# Patient Record
Sex: FEMALE | Race: WHITE | Employment: OTHER | URBAN - METROPOLITAN AREA
[De-identification: names, ages, dates, MRNs, and addresses within clinical notes are randomized per-mention and may not be internally consistent; named-entity substitution may affect disease eponyms.]

---

## 2017-06-11 ENCOUNTER — HOSPITAL ENCOUNTER (EMERGENCY)
Age: 82
Discharge: HOME | End: 2017-06-11
Attending: EMERGENCY MEDICINE
Payer: MEDICARE

## 2017-06-11 ENCOUNTER — APPOINTMENT (OUTPATIENT)
Dept: GENERAL RADIOLOGY | Age: 82
End: 2017-06-11
Attending: EMERGENCY MEDICINE
Payer: MEDICARE

## 2017-06-11 VITALS
HEIGHT: 62 IN | WEIGHT: 96 LBS | SYSTOLIC BLOOD PRESSURE: 149 MMHG | DIASTOLIC BLOOD PRESSURE: 65 MMHG | HEART RATE: 82 BPM | TEMPERATURE: 97.9 F | BODY MASS INDEX: 17.66 KG/M2 | RESPIRATION RATE: 18 BRPM | OXYGEN SATURATION: 98 %

## 2017-06-11 DIAGNOSIS — N30.00 ACUTE CYSTITIS WITHOUT HEMATURIA: Primary | ICD-10-CM

## 2017-06-11 DIAGNOSIS — K90.0 CELIAC DISEASE: ICD-10-CM

## 2017-06-11 DIAGNOSIS — R10.84 ABDOMINAL PAIN, GENERALIZED: ICD-10-CM

## 2017-06-11 LAB
ALBUMIN SERPL BCP-MCNC: 4 G/DL (ref 3.4–5)
ALBUMIN/GLOB SERPL: 1.1 {RATIO} (ref 0.8–1.7)
ALP SERPL-CCNC: 52 U/L (ref 45–117)
ALT SERPL-CCNC: 16 U/L (ref 13–56)
AMORPH CRY URNS QL MICRO: ABNORMAL
ANION GAP BLD CALC-SCNC: 14 MMOL/L (ref 3–18)
APPEARANCE UR: ABNORMAL
AST SERPL W P-5'-P-CCNC: 22 U/L (ref 15–37)
BACTERIA URNS QL MICRO: ABNORMAL /HPF
BASOPHILS # BLD AUTO: 0 K/UL (ref 0–0.06)
BASOPHILS # BLD: 0 % (ref 0–2)
BILIRUB SERPL-MCNC: 0.8 MG/DL (ref 0.2–1)
BILIRUB UR QL: NEGATIVE
BUN SERPL-MCNC: 18 MG/DL (ref 7–18)
BUN/CREAT SERPL: 25 (ref 12–20)
CALCIUM SERPL-MCNC: 9.2 MG/DL (ref 8.5–10.1)
CHLORIDE SERPL-SCNC: 100 MMOL/L (ref 100–108)
CO2 SERPL-SCNC: 26 MMOL/L (ref 21–32)
COLOR UR: YELLOW
CREAT SERPL-MCNC: 0.71 MG/DL (ref 0.6–1.3)
DIFFERENTIAL METHOD BLD: NORMAL
EOSINOPHIL # BLD: 0 K/UL (ref 0–0.4)
EOSINOPHIL NFR BLD: 0 % (ref 0–5)
EPITH CASTS URNS QL MICRO: ABNORMAL /LPF (ref 0–5)
ERYTHROCYTE [DISTWIDTH] IN BLOOD BY AUTOMATED COUNT: 13.4 % (ref 11.6–14.5)
GLOBULIN SER CALC-MCNC: 3.8 G/DL (ref 2–4)
GLUCOSE SERPL-MCNC: 101 MG/DL (ref 74–99)
GLUCOSE UR STRIP.AUTO-MCNC: NEGATIVE MG/DL
GRAN CASTS URNS QL MICRO: ABNORMAL /LPF
HCT VFR BLD AUTO: 40 % (ref 35–45)
HGB BLD-MCNC: 14.1 G/DL (ref 12–16)
HGB UR QL STRIP: ABNORMAL
KETONES UR QL STRIP.AUTO: 15 MG/DL
LEUKOCYTE ESTERASE UR QL STRIP.AUTO: ABNORMAL
LIPASE SERPL-CCNC: 329 U/L (ref 73–393)
LYMPHOCYTES # BLD AUTO: 29 % (ref 21–52)
LYMPHOCYTES # BLD: 2 K/UL (ref 0.9–3.6)
MCH RBC QN AUTO: 29.3 PG (ref 24–34)
MCHC RBC AUTO-ENTMCNC: 35.3 G/DL (ref 31–37)
MCV RBC AUTO: 83.2 FL (ref 74–97)
MONOCYTES # BLD: 0.7 K/UL (ref 0.05–1.2)
MONOCYTES NFR BLD AUTO: 10 % (ref 3–10)
NEUTS SEG # BLD: 4.2 K/UL (ref 1.8–8)
NEUTS SEG NFR BLD AUTO: 61 % (ref 40–73)
NITRITE UR QL STRIP.AUTO: POSITIVE
PH UR STRIP: 7 [PH] (ref 5–8)
PLATELET # BLD AUTO: 226 K/UL (ref 135–420)
PMV BLD AUTO: 9.2 FL (ref 9.2–11.8)
POTASSIUM SERPL-SCNC: 3.5 MMOL/L (ref 3.5–5.5)
PROT SERPL-MCNC: 7.8 G/DL (ref 6.4–8.2)
PROT UR STRIP-MCNC: ABNORMAL MG/DL
RBC # BLD AUTO: 4.81 M/UL (ref 4.2–5.3)
RBC #/AREA URNS HPF: ABNORMAL /HPF (ref 0–5)
SODIUM SERPL-SCNC: 140 MMOL/L (ref 136–145)
SP GR UR REFRACTOMETRY: 1.02 (ref 1–1.03)
UROBILINOGEN UR QL STRIP.AUTO: 1 EU/DL (ref 0.2–1)
WBC # BLD AUTO: 7 K/UL (ref 4.6–13.2)
WBC URNS QL MICRO: ABNORMAL /HPF (ref 0–5)

## 2017-06-11 PROCEDURE — 74011250636 HC RX REV CODE- 250/636: Performed by: EMERGENCY MEDICINE

## 2017-06-11 PROCEDURE — 85025 COMPLETE CBC W/AUTO DIFF WBC: CPT | Performed by: EMERGENCY MEDICINE

## 2017-06-11 PROCEDURE — 96360 HYDRATION IV INFUSION INIT: CPT

## 2017-06-11 PROCEDURE — 81001 URINALYSIS AUTO W/SCOPE: CPT | Performed by: EMERGENCY MEDICINE

## 2017-06-11 PROCEDURE — 99285 EMERGENCY DEPT VISIT HI MDM: CPT

## 2017-06-11 PROCEDURE — 80053 COMPREHEN METABOLIC PANEL: CPT | Performed by: EMERGENCY MEDICINE

## 2017-06-11 PROCEDURE — 83690 ASSAY OF LIPASE: CPT | Performed by: EMERGENCY MEDICINE

## 2017-06-11 PROCEDURE — 74022 RADEX COMPL AQT ABD SERIES: CPT

## 2017-06-11 RX ORDER — DICYCLOMINE HYDROCHLORIDE 10 MG/1
10 CAPSULE ORAL 4 TIMES DAILY
Qty: 20 CAP | Refills: 0 | Status: SHIPPED | OUTPATIENT
Start: 2017-06-11 | End: 2017-06-16

## 2017-06-11 RX ORDER — SULFAMETHOXAZOLE AND TRIMETHOPRIM 800; 160 MG/1; MG/1
1 TABLET ORAL 2 TIMES DAILY
Qty: 10 TAB | Refills: 0 | Status: SHIPPED | OUTPATIENT
Start: 2017-06-11 | End: 2017-06-16

## 2017-06-11 RX ORDER — SODIUM CHLORIDE 0.9 % (FLUSH) 0.9 %
5-10 SYRINGE (ML) INJECTION EVERY 8 HOURS
Status: DISCONTINUED | OUTPATIENT
Start: 2017-06-11 | End: 2017-06-11 | Stop reason: HOSPADM

## 2017-06-11 RX ORDER — SODIUM CHLORIDE 0.9 % (FLUSH) 0.9 %
5-10 SYRINGE (ML) INJECTION AS NEEDED
Status: DISCONTINUED | OUTPATIENT
Start: 2017-06-11 | End: 2017-06-11 | Stop reason: HOSPADM

## 2017-06-11 RX ADMIN — SODIUM CHLORIDE 500 ML: 900 INJECTION, SOLUTION INTRAVENOUS at 13:27

## 2017-06-11 NOTE — ED PROVIDER NOTES
HPI Comments: 12:09 PM   Bre Rai is a 80 y.o. female h/o celiac disease presenting to the ED via EMS C/O back pain from active shingles, onset 1 month ago. Pt also complains of chronic abdominal pain (8/10) and constipation, which she has experienced in the past due to her celiac disease. \"I know I do not eat right. \" PMHx of colon cancer and abdominal cancer. Pt denies blood in the stool and any other Sx or complaints. Written by EMANUEL Ramirez, as dictated by Jarod Lezama MD.      Patient is a 80 y.o. female presenting with abdominal pain. The history is provided by the patient. No  was used. Abdominal Pain    This is a chronic problem. Episode onset: 1 month ago. The pain is at a severity of 8/10. Associated symptoms include constipation and back pain (from shingles). Pertinent negatives include no fever, no diarrhea, no nausea, no vomiting, no dysuria, no frequency, no hematuria, no headaches, no arthralgias, no myalgias and no chest pain. Her past medical history is significant for cancer. Past Medical History:   Diagnosis Date    Arthritis     Cancer ([address]     ovarian /bowel cancer    Ill-defined condition     celiac disease    Neurological disorder     ataxia/dizziness       Past Surgical History:   Procedure Laterality Date    ABDOMEN SURGERY PROC UNLISTED      bowel resection    HX GYN      hysterectomy    HX HEENT      tonsillectomy         History reviewed. No pertinent family history. Social History     Social History    Marital status:      Spouse name: N/A    Number of children: N/A    Years of education: N/A     Occupational History    Not on file.      Social History Main Topics    Smoking status: Never Smoker    Smokeless tobacco: Not on file    Alcohol use No    Drug use: No    Sexual activity: Not on file     Other Topics Concern    Not on file     Social History Narrative    No narrative on file ALLERGIES: Review of patient's allergies indicates no known allergies. Review of Systems   Constitutional: Negative for appetite change, chills and fever. HENT: Negative for congestion, rhinorrhea and sore throat. Eyes: Negative for pain, discharge and visual disturbance. Respiratory: Negative for cough, chest tightness, shortness of breath and wheezing. Cardiovascular: Negative for chest pain and leg swelling. Gastrointestinal: Positive for abdominal pain and constipation. Negative for diarrhea, nausea and vomiting. Endocrine: Negative for polydipsia and polyuria. Genitourinary: Negative for difficulty urinating, dysuria, frequency, hematuria, menstrual problem, pelvic pain, urgency, vaginal bleeding and vaginal discharge. Musculoskeletal: Positive for back pain (from shingles). Negative for arthralgias and myalgias. Skin: Positive for rash (shingles on the back). Negative for color change. Neurological: Negative for weakness, numbness and headaches. Hematological: Does not bruise/bleed easily. Psychiatric/Behavioral: Negative for decreased concentration. All other systems reviewed and are negative. Vitals:    06/11/17 1122 06/11/17 1307   BP: 158/68 149/65   Pulse: 79 82   Resp: 18 18   Temp: 97.9 °F (36.6 °C)    SpO2: 99% 98%   Weight: 43.5 kg (96 lb)    Height: 5' 2\" (1.575 m)             Physical Exam   Nursing note and vitals reviewed. GEN:  Nontoxic appearing; Alert and Oriented; Appears well hydrated. No acute distress. SKIN:  Warm and dry, no rashes. No petechia. Good skin turgor. HEAD:  Normocephalic and Atraumatic;    ENT: Oral mucosa tachy, pharynx clear;   NECK:  Supple. Trachea is Midline; No adenopathy. HEART:  Regular rate and rhythm No murmur. No rubs. LUNGS:  Clear bilaterally; Normal respiratory effort,  ABD:  Positive bowel sounds. Soft and flat with tenderness on right and left mid quadrant. No peritoneal signs. No organomegaly.  No hernias. BACK: No obvious bony spinal defects or changes; Non tender paraspinous muscles  : Normal inspection; no rash; NO CVAT  EXT:  No obvious soft tissue tenderness or sites of bony tenderness; Joints- good ROM. No LE edema. NEURO: CN- intact; No focal motor deficits; Cerebellar function- intact; DTR's - 2+     MDM  Number of Diagnoses or Management Options  Diagnosis management comments: INITIAL CLINICAL IMPRESSION and PLANS:  The patient presents with the primary complaint(s) of: abdominal pain. The presentation, to include historical aspects and clinical findings appear to be consistent with the DX of constipation. However, other possible DX's to consider as primary, associated with, or exacerbated by include:    1.  celiac disease   2. IBS   3.  colon cancer   4. bowel obstruction       Kendall Elizondo MD         Amount and/or Complexity of Data Reviewed  Clinical lab tests: ordered and reviewed  Tests in the radiology section of CPT®: ordered and reviewed (XR ABD)  Independent visualization of images, tracings, or specimens: yes (XR ABD)      ED Course     MEDICATIONS GIVEN:  Medications   sodium chloride (NS) flush 5-10 mL (not administered)   sodium chloride (NS) flush 5-10 mL (not administered)   sodium chloride 0.9 % bolus infusion 500 mL (0 mL IntraVENous IV Completed 6/11/17 1414)        Procedures    ED CLINICAL SUMMARY - DISCHARGE     2:06 PM  CLINICAL COURSE while in the ED:      Intervention)s) while in ED:  ACTIONS / APPROACH: Based on the presenting RECURRENT history of abdominal pain My initial focus was to determine the extent of the problem. Details of actions taken are noted below. SPECIFICS REGARDING APPROACH: since the complaint was non specific and sounded to be chronic /  Recurrent in nature, xrays will be done instead of CT scan. 1. DIAGNOSTIC RESULTS:     PULSE OXIMETRY NOTE:  Pulse-ox is 100% on room air.    Interpretation: Normal    2:07 PM  RADIOLOGY FINDINGS  ABD X-ray shows a focal collection of gas, but no obstructive pattern. Pending review by Radiologist  Recorded by Mark Latham ED Scribe, as dictated by Daryel Snellen, MD     XR ABD ACUTE W 1 V CHEST   Final Result   The lungs are clear of focal infiltrate. There is no pneumothorax or pleural  effusion. Heart size is within normal limits. Atherosclerosis of the thoracic  aorta. There is no significant vascular congestion. There are distended small bowel loops in the right upper quadrant. Moderate  stool burden in the colon and rectum. Surgical sutures in the left upper  quadrant. There is no free air seen on the upright view.      There is no acute osseous abnormality.    As read by the radiologist.       Labs Reviewed   URINALYSIS W/ RFLX MICROSCOPIC - Abnormal; Notable for the following:        Result Value    Protein TRACE (*)     Ketone 15 (*)     Blood TRACE (*)     Nitrites POSITIVE (*)     Leukocyte Esterase SMALL (*)     All other components within normal limits   METABOLIC PANEL, COMPREHENSIVE - Abnormal; Notable for the following:     Glucose 101 (*)     BUN/Creatinine ratio 25 (*)     All other components within normal limits   URINE MICROSCOPIC ONLY - Abnormal; Notable for the following:     Bacteria 3+ (*)     Amorphous Crystals FEW (*)     All other components within normal limits   CBC WITH AUTOMATED DIFF   LIPASE     Recent Results (from the past 12 hour(s))   URINALYSIS W/ RFLX MICROSCOPIC    Collection Time: 06/11/17 11:41 AM   Result Value Ref Range    Color YELLOW      Appearance CLOUDY      Specific gravity 1.019 1.005 - 1.030      pH (UA) 7.0 5.0 - 8.0      Protein TRACE (A) NEG mg/dL    Glucose NEGATIVE  NEG mg/dL    Ketone 15 (A) NEG mg/dL    Bilirubin NEGATIVE  NEG      Blood TRACE (A) NEG      Urobilinogen 1.0 0.2 - 1.0 EU/dL    Nitrites POSITIVE (A) NEG      Leukocyte Esterase SMALL (A) NEG     URINE MICROSCOPIC ONLY    Collection Time: 06/11/17 11:41 AM   Result Value Ref Range WBC 4 to 10 0 - 5 /hpf    RBC 0 to 3 0 - 5 /hpf    Epithelial cells FEW 0 - 5 /lpf    Bacteria 3+ (A) NEG /hpf    Amorphous Crystals FEW (A) NEG      Granular cast 4 to 10 NEG /lpf   CBC WITH AUTOMATED DIFF    Collection Time: 06/11/17 11:54 AM   Result Value Ref Range    WBC 7.0 4.6 - 13.2 K/uL    RBC 4.81 4.20 - 5.30 M/uL    HGB 14.1 12.0 - 16.0 g/dL    HCT 40.0 35.0 - 45.0 %    MCV 83.2 74.0 - 97.0 FL    MCH 29.3 24.0 - 34.0 PG    MCHC 35.3 31.0 - 37.0 g/dL    RDW 13.4 11.6 - 14.5 %    PLATELET 684 912 - 273 K/uL    MPV 9.2 9.2 - 11.8 FL    NEUTROPHILS 61 40 - 73 %    LYMPHOCYTES 29 21 - 52 %    MONOCYTES 10 3 - 10 %    EOSINOPHILS 0 0 - 5 %    BASOPHILS 0 0 - 2 %    ABS. NEUTROPHILS 4.2 1.8 - 8.0 K/UL    ABS. LYMPHOCYTES 2.0 0.9 - 3.6 K/UL    ABS. MONOCYTES 0.7 0.05 - 1.2 K/UL    ABS. EOSINOPHILS 0.0 0.0 - 0.4 K/UL    ABS. BASOPHILS 0.0 0.0 - 0.06 K/UL    DF AUTOMATED     METABOLIC PANEL, COMPREHENSIVE    Collection Time: 06/11/17 11:54 AM   Result Value Ref Range    Sodium 140 136 - 145 mmol/L    Potassium 3.5 3.5 - 5.5 mmol/L    Chloride 100 100 - 108 mmol/L    CO2 26 21 - 32 mmol/L    Anion gap 14 3.0 - 18 mmol/L    Glucose 101 (H) 74 - 99 mg/dL    BUN 18 7.0 - 18 MG/DL    Creatinine 0.71 0.6 - 1.3 MG/DL    BUN/Creatinine ratio 25 (H) 12 - 20      GFR est AA >60 >60 ml/min/1.73m2    GFR est non-AA >60 >60 ml/min/1.73m2    Calcium 9.2 8.5 - 10.1 MG/DL    Bilirubin, total 0.8 0.2 - 1.0 MG/DL    ALT (SGPT) 16 13 - 56 U/L    AST (SGOT) 22 15 - 37 U/L    Alk. phosphatase 52 45 - 117 U/L    Protein, total 7.8 6.4 - 8.2 g/dL    Albumin 4.0 3.4 - 5.0 g/dL    Globulin 3.8 2.0 - 4.0 g/dL    A-G Ratio 1.1 0.8 - 1.7     LIPASE    Collection Time: 06/11/17 11:59 AM   Result Value Ref Range    Lipase 329 73 - 393 U/L       2.  MEDICATIONS GIVEN:   Medications   sodium chloride (NS) flush 5-10 mL (not administered)   sodium chloride (NS) flush 5-10 mL (not administered)   sodium chloride 0.9 % bolus infusion 500 mL (0 mL IntraVENous IV Completed 6/11/17 0558)        Response to Intervention(s):   IMPROVED       Unanticipated Developments: NONE     ED COURSE - General Comment:  During the ED course I had re-evaluated the patient, answered their and /or their family's questions regarding my clinical impression, the patient's condition and plans for therapeutic interventions. The patient's ED course was uneventful and remained stable throughout. CLINICAL IMPRESSION AND DISCUSSION:   I reviewed our electronic medical record system for any past medical records that were available that may contribute to the patients current condition, the nursing notes and vital signs from today's visit. Based on the clinical presentation, findings and results of diagnostic studies, as well as developments while in the ED,  I suspect the following: For the presentation noted above    My clinical impression is: chronic abdominal pain with constipation and possible exacerbation of celiac's. DISCUSSION REGARDING CLINICAL IMPRESSION: The specifics regarding my clinical impression / diagnosis are as follows: At this time there is no clinical evidence to support other pertinent diagnostic considerations such as:   Doubt bowel obstruction since the patient appears to have been having some passage of stool. SUMMARY DISCUSSION: pt with chronic problems with her intestines related to her remote colon ca and her current Celiac's disease having recurrent abdominal pain which seems to be more related to constipation and not bowel obstruction. Pt was walking about the ED and insisting to go home. She appeared to be confused as to why she was here in the first place. She was discharged home along with her daughter.        Specific Conversations:  NONE      DISPOSITION DECISION:     DISCHARGE: I feel that we have optimized outpatient assessment and management such that Мария Davison is stable to be discharged and to continue with her care or complete any additional evaluation as appropriate at home or as an outpatient. Preparations will be made to discharge the patient. Present condition at the time of disposition: STABLE    ANY SPECIFIC INFORMATION REGARDING THE DISPOSITION: NONE        DISCHARGE NOTE:    Georgette Faulkner's  results have been reviewed with her. She has been counseled regarding her diagnosis, treatment, and plan. She verbally conveys understanding and agreement of the signs, symptoms, diagnosis, treatment and prognosis and additionally agrees to follow up as discussed. She also agrees with the care-plan and conveys that all of her questions have been answered. I have also provided discharge instructions for her that include: educational information regarding their diagnosis and treatment, and list of reasons why they would want to return to the ED prior to their follow-up appointment, should her condition change. The patient and/or family has been provided with education for proper Emergency Department utilization. CLINICAL IMPRESSION  1. Acute cystitis without hematuria    2. Celiac disease    3. Abdominal pain, generalized        PLAN:  1. D/C home  2. Patient's Medications   Start Taking    DICYCLOMINE (BENTYL) 10 MG CAPSULE    Take 1 Cap by mouth four (4) times daily for 5 days. DOCUSATE SODIUM (COLACE) 50 MG CAPSULE    Take 1 Cap by mouth two (2) times a day for 90 days. TRIMETHOPRIM-SULFAMETHOXAZOLE (BACTRIM DS) 160-800 MG PER TABLET    Take 1 Tab by mouth two (2) times a day for 5 days. Continue Taking    No medications on file   These Medications have changed    No medications on file   Stop Taking    No medications on file     3. Follow-up Information     Follow up With Details Comments [address] [facility], DO Schedule an appointment as soon as possible for a visit in 2 days or follow up with your primary care physician.   Gage Diaz [id number]      [facility]  As needed, If symptoms worsen [address] [place] [address]  [phone]        Return if sxs worsen    ATTESTATIONS:  This note is prepared by Adriana Wei, acting as Scribe for Bart Padron MD.    Bart Padron MD: The scribe's documentation has been prepared under my direction and personally reviewed by me in its entirety. I confirm that the note above accurately reflects all work, treatment, procedures, and medical decision making performed by me.

## 2017-06-11 NOTE — DISCHARGE INSTRUCTIONS
Abdominal Pain: Care Instructions  Your Care Instructions    Abdominal pain has many possible causes. Some aren't serious and get better on their own in a few days. Others need more testing and treatment. If your pain continues or gets worse, you need to be rechecked and may need more tests to find out what is wrong. You may need surgery to correct the problem. Don't ignore new symptoms, such as fever, nausea and vomiting, urination problems, pain that gets worse, and dizziness. These may be signs of a more serious problem. Your doctor may have recommended a follow-up visit in the next 8 to 12 hours. If you are not getting better, you may need more tests or treatment. The doctor has checked you carefully, but problems can develop later. If you notice any problems or new symptoms, get medical treatment right away. Follow-up care is a key part of your treatment and safety. Be sure to make and go to all appointments, and call your doctor if you are having problems. It's also a good idea to know your test results and keep a list of the medicines you take. How can you care for yourself at home? · Rest until you feel better. · To prevent dehydration, drink plenty of fluids, enough so that your urine is light yellow or clear like water. Choose water and other caffeine-free clear liquids until you feel better. If you have kidney, heart, or liver disease and have to limit fluids, talk with your doctor before you increase the amount of fluids you drink. · If your stomach is upset, eat mild foods, such as rice, dry toast or crackers, bananas, and applesauce. Try eating several small meals instead of two or three large ones. · Wait until 48 hours after all symptoms have gone away before you have spicy foods, alcohol, and drinks that contain caffeine. · Do not eat foods that are high in fat. · Avoid anti-inflammatory medicines such as aspirin, ibuprofen (Advil, Motrin), and naproxen (Aleve).  These can cause stomach upset. Talk to your doctor if you take daily aspirin for another health problem. When should you call for help? Call 911 anytime you think you may need emergency care. For example, call if:  · You passed out (lost consciousness). · You pass maroon or very bloody stools. · You vomit blood or what looks like coffee grounds. · You have new, severe belly pain. Call your doctor now or seek immediate medical care if:  · Your pain gets worse, especially if it becomes focused in one area of your belly. · You have a new or higher fever. · Your stools are black and look like tar, or they have streaks of blood. · You have unexpected vaginal bleeding. · You have symptoms of a urinary tract infection. These may include:  ¨ Pain when you urinate. ¨ Urinating more often than usual.  ¨ Blood in your urine. · You are dizzy or lightheaded, or you feel like you may faint. Watch closely for changes in your health, and be sure to contact your doctor if:  · You are not getting better after 1 day (24 hours). Where can you learn more? Go to http://imtiazNexSteppedavid.info/. Enter N843 in the search box to learn more about \"Abdominal Pain: Care Instructions. \"  Current as of: May 27, 2016  Content Version: 11.2  © 9505-9306 [employer]. Care instructions adapted under license by [employer] (which disclaims liability or warranty for this information). If you have questions about a medical condition or this instruction, always ask your healthcare professional. [place] [address] any warranty or liability for your use of this information. Celiac Disease: Care Instructions  Your Care Instructions  Celiac disease (or celiac sprue) is a problem with digesting gluten. Gluten is a type of protein found in wheat, rye, and other grains. This problem starts when the body's immune system attacks the small intestine when gluten is eaten.  The immune system is supposed to fight off viruses and other invaders, but sometimes it turns on the person's own body. (This is called an autoimmune disease.) Celiac disease seems to run in families. Celiac disease causes damage to the small intestine. This makes it hard for the body to absorb vitamins and other nutrients. You cannot prevent celiac disease. But you can stop and reverse the damage to the small intestine by eating a strict gluten-free diet. Follow-up care is a key part of your treatment and safety. Be sure to make and go to all appointments, and call your doctor if you are having problems. It's also a good idea to know your test results and keep a list of the medicines you take. How can you care for yourself at home? · Eat a gluten-free diet to prevent symptoms and damage to the small intestine. Even a small amount of gluten may cause damage. ¨ Avoid all foods that contain wheat, rye, and barley gluten. Bread, bagels, pasta, pizza, malted breakfast cereals, and crackers are all examples of foods that contain gluten. ¨ Avoid oats, at least at first. Oats cause symptoms in some people. After your symptoms stop, you may be able to add oats to your diet. · You may need to avoid milk and milk products for a while. Once you stop eating any gluten, the intestine will begin to heal. Then it should be okay to drink milk and eat milk products. · Read food labels carefully and look for hidden gluten, such as gluten in medicine and some food additives. If a label says \"modified food starch,\" the product may contain gluten. · Plan your diet around:  ¨ Eggs. ¨ Dairy products, if you can eat them. Cheese, yogurt, and other dairy products can be an important part of the diet. ¨ Flours and foods made with amaranth, arrowroot, beans, buckwheat, corn, cornmeal, flax, millet, potatoes, pure uncontaminated nut and oat bran, quinoa, rice, sorghum, soybeans, tapioca, or teff. ¨ Fresh, frozen, and canned meats, fruits, and vegetables.  Watch for added gluten. · Talk to your doctor or contact your local hospital or dietitian for information about support groups in your area. You may find a support group helpful for discovering ways to help you deal with celiac disease. Celiac disease support groups often share recipes and good food sources. · Look for gluten-free foods. Many food stores, especially health food stores, offer specially marked gluten-free food. When should you call for help? Watch closely for changes in your health, and be sure to contact your doctor if:  · Your bloating, gas, and diarrhea get worse. · You have bloating, gas, and diarrhea after not having them for a while. Where can you learn more? Go to http://imtiazBioMaxdavid.info/. Enter [id number] in the search box to learn more about \"Celiac Disease: Care Instructions. \"  Current as of: August 9, 2016  Content Version: 11.2  © 2398-2504 [employer]. Care instructions adapted under license by [employer] (which disclaims liability or warranty for this information). If you have questions about a medical condition or this instruction, always ask your healthcare professional. [place] [address] any warranty or liability for your use of this information. Gluten-Free Diet: Care Instructions  Your Care Instructions  To help your symptoms, your doctor has recommended a gluten-free diet. This means not eating foods that have gluten in them. Gluten is a kind of protein. It's found in wheat, barley, and rye. If you eat a gluten-free diet, you can help manage your symptoms and prevent long-term problems. You can also get all the nutrition you need. Follow-up care is a key part of your treatment and safety. Be sure to make and go to all appointments, and call your doctor if you are having problems. It's also a good idea to know your test results and keep a list of the medicines you take. How can you care for yourself at home?   · Don't eat any foods that have gluten in them. These include bagels, bread, crackers, and some cereals. They also include pasta and pizza. · Carefully read food labels. Look for wheat or wheat products in ice cream and candy. You may also find them in salad dressing, canned and frozen soups and vegetables, and other processed foods. · Avoid all beer products unless the label says they are gluten-free. Beers with and without alcohol have gluten unless the labels say they are gluten-free. This includes lagers, ales, and stouts. · When you eat out, look for restaurants that serve gluten-free food. You can also ask if the  is familiar with gluten-free cooking. · Try to learn more about gluten-free options. Find grocery stores that sell gluten-free pizza and other foods. If you have access to the Internet, look online for gluten-free foods and recipes. · On a gluten-free eating plan, it's okay to have:  ¨ Eggs and dairy products. (But some dairy products may make your symptoms worse. Ask your doctor if you have questions about dairy products. Read ingredient labels carefully. Some processed cheeses contain gluten.)  ¨ Flours and foods made with amaranth, arrowroot, beans, buckwheat, corn, cornmeal, flax, millet, potatoes, pure uncontaminated nut and oat bran, quinoa, rice, sorghum, soybeans, tapioca, or teff. ¨ Fresh, frozen, or canned unprocessed meats. But avoid processed meats. Some examples of processed meats to avoid are hot dogs, salami, and deli meat. Read labels for additives that may contain gluten. ¨ Fresh, frozen, dried, or canned fruits and vegetables, if they do not have thickeners or other additives that contain gluten. ¨ Some alcohol drinks. These include wine, liqueurs, and ciders. They also include liquor like whiskey and giselle. When should you call for help? Watch closely for changes in your health, and be sure to contact your doctor if:  · You have unexplained weight loss.   · You have diarrhea that lasts longer than 1 to 2 weeks. · You have unusual fatigue or mood changes, especially if these last more than a week and are not related to any other illness, such as the flu. · Your symptoms come back again. · Your stomach pain gets worse. Where can you learn more? Go to http://imtiaz-david.info/. Enter 31 [id number] in the search box to learn more about \"Gluten-Free Diet: Care Instructions. \"  Current as of: July 26, 2016  Content Version: 11.2  © 5868-3571 [employer] Care instructions adapted under license by [employer] (which disclaims liability or warranty for this information). If you have questions about a medical condition or this instruction, always ask your healthcare professional. [place] [address] any warranty or liability for your use of this information. Urinary Tract Infection in Women: Care Instructions  Your Care Instructions    A urinary tract infection, or UTI, is a general term for an infection anywhere between the kidneys and the urethra (where urine comes out). Most UTIs are bladder infections. They often cause pain or burning when you urinate. UTIs are caused by bacteria and can be cured with antibiotics. Be sure to complete your treatment so that the infection goes away. Follow-up care is a key part of your treatment and safety. Be sure to make and go to all appointments, and call your doctor if you are having problems. It's also a good idea to know your test results and keep a list of the medicines you take. How can you care for yourself at home? · Take your antibiotics as directed. Do not stop taking them just because you feel better. You need to take the full course of antibiotics. · Drink extra water and other fluids for the next day or two. This may help wash out the bacteria that are causing the infection.  (If you have kidney, heart, or liver disease and have to limit fluids, talk with your doctor before you increase your fluid intake.)  · Avoid drinks that are carbonated or have caffeine. They can irritate the bladder. · Urinate often. Try to empty your bladder each time. · To relieve pain, take a hot bath or lay a heating pad set on low over your lower belly or genital area. Never go to sleep with a heating pad in place. To prevent UTIs  · Drink plenty of water each day. This helps you urinate often, which clears bacteria from your system. (If you have kidney, heart, or liver disease and have to limit fluids, talk with your doctor before you increase your fluid intake.)  · Urinate when you need to. · Urinate right after you have sex. · Change sanitary pads often. · Avoid douches, bubble baths, feminine hygiene sprays, and other feminine hygiene products that have deodorants. · After going to the bathroom, wipe from front to back. When should you call for help? Call your doctor now or seek immediate medical care if:  · Symptoms such as fever, chills, nausea, or vomiting get worse or appear for the first time. · You have new pain in your back just below your rib cage. This is called flank pain. · There is new blood or pus in your urine. · You have any problems with your antibiotic medicine. Watch closely for changes in your health, and be sure to contact your doctor if:  · You are not getting better after taking an antibiotic for 2 days. · Your symptoms go away but then come back. Where can you learn more? Go to http://imtiaz-david.info/. Enter W741 in the search box to learn more about \"Urinary Tract Infection in Women: Care Instructions. \"  Current as of: November 28, 2016  Content Version: 11.2  © 8011-6785 [employer]. Care instructions adapted under license by [employer] (which disclaims liability or warranty for this information).  If you have questions about a medical condition or this instruction, always ask your healthcare professional. [employer] disclaims any warranty or liability for your use of this information. Urinary Tract Infection in Women: Care Instructions  Your Care Instructions    A urinary tract infection, or UTI, is a general term for an infection anywhere between the kidneys and the urethra (where urine comes out). Most UTIs are bladder infections. They often cause pain or burning when you urinate. UTIs are caused by bacteria and can be cured with antibiotics. Be sure to complete your treatment so that the infection goes away. Follow-up care is a key part of your treatment and safety. Be sure to make and go to all appointments, and call your doctor if you are having problems. It's also a good idea to know your test results and keep a list of the medicines you take. How can you care for yourself at home? · Take your antibiotics as directed. Do not stop taking them just because you feel better. You need to take the full course of antibiotics. · Drink extra water and other fluids for the next day or two. This may help wash out the bacteria that are causing the infection. (If you have kidney, heart, or liver disease and have to limit fluids, talk with your doctor before you increase your fluid intake.)  · Avoid drinks that are carbonated or have caffeine. They can irritate the bladder. · Urinate often. Try to empty your bladder each time. · To relieve pain, take a hot bath or lay a heating pad set on low over your lower belly or genital area. Never go to sleep with a heating pad in place. To prevent UTIs  · Drink plenty of water each day. This helps you urinate often, which clears bacteria from your system. (If you have kidney, heart, or liver disease and have to limit fluids, talk with your doctor before you increase your fluid intake.)  · Urinate when you need to. · Urinate right after you have sex. · Change sanitary pads often.   · Avoid douches, bubble baths, feminine hygiene sprays, and other feminine hygiene products that have deodorants. · After going to the bathroom, wipe from front to back. When should you call for help? Call your doctor now or seek immediate medical care if:  · Symptoms such as fever, chills, nausea, or vomiting get worse or appear for the first time. · You have new pain in your back just below your rib cage. This is called flank pain. · There is new blood or pus in your urine. · You have any problems with your antibiotic medicine. Watch closely for changes in your health, and be sure to contact your doctor if:  · You are not getting better after taking an antibiotic for 2 days. · Your symptoms go away but then come back. Where can you learn more? Go to http://imtiaz-david.info/. Enter M899 in the search box to learn more about \"Urinary Tract Infection in Women: Care Instructions. \"  Current as of: November 28, 2016  Content Version: 11.2  © 1345-6599 [employer]. Care instructions adapted under license by [employer] (which disclaims liability or warranty for this information). If you have questions about a medical condition or this instruction, always ask your healthcare professional. [place] [address] any warranty or liability for your use of this information.

## 2017-06-11 NOTE — ED NOTES
Patient armband removed and shredded  I have reviewed discharge instructions with the caregiver. The caregiver verbalized understanding. Pt's daughter arrived to take pt home - d/c instructions reviewed w/ her.

## 2017-06-11 NOTE — ED TRIAGE NOTES
Ems called to pt's home for reports of pain from shingles active on her back - x 1 month per pt - and abd pain - pt w/ history celiac disease - unable to eat well - and no BM reported for 4-5 days. Pt w/ abd pain - no n/v'/d reported.

## 2017-06-11 NOTE — ED NOTES
Called pt's  - at home - he states he is sick w/ a cold and is not able to drive to pick her up. He asked if we could hold onto her for awhile until his daughter from [place] could come pick her up. Inquired if he had a neighbor or friend who could come pick her up and he stated NO.   Daughter phoned ED while I was on phone w/  - asking for directions to this hospital.

## 2017-06-11 NOTE — Clinical Note
SPECIAL DISCHARGE INSTRUCTIONS AND COMMENTS: 
 
General comments: Thank you for allowing us to provide you with excellent care today. We hope we addressed all of your concerns and needs. We strive to provide excellent quality care in the Emergency Depart ment. If you feel that you have not received excellent quality care or timely care, please ask to speak to the nurse manager. Please choose us in the future for your continued health care needs. Follow-up comments: The exam and treatment you rece ived in the Emergency Department were for an urgent problem that may be new for you and / or one which may be related to a worsening of a chronic or ongoing medical problem that you already had prior to this visit. In any case, today's treatment is not i ntended to be considered as complete care in all cases and thus, it is important that you follow-up with a doctor, nurse practitioner, or physicians assistant to: (1) confirm your diagnosis, (2) re-evaluation of changes in your illness and treatment, an d (3) for ongoing care. In some cases we may have contacted your doctor or a specific specialist who may be involved in your future evaluation and care but in any case, take this sheet with you when you go to your follow-up visit or refer to the infor mery on these sheets when you are calling to arrange an appointment - it may prove helpful in making the appointment. Prescribed Medications: Unless you have been directed by the provider to change your current medicines, you should continue to chuyita e them as before. If you have been prescribed medicines, please take them as directed. In some cases, these new medicines are intended to replace a medicine that you are currently taking and if so, this will be noted below.  
 
 Our expectation is that y our condition will improve by following the doctor's recommendations, however, if in the event your condition worsens or does not improve as expected, please follow-up with your PCP or if unable to reach your usual health care provider, you should return  to the Emergency Department. We are available 24 hours a day.   
 
SPECIFIC INSTRUCTIONS PROVIDED BY YOUR DOCTOR, Emilee Nguyen MD:

## 2017-06-11 NOTE — ED NOTES
Pt agitated at times due to \"I didn't know I was going to come here to die\" regarding the length of her ED visit - pt insisting her  came w/ her on the EMS squad (he did not - he is at home) and she is waiting for her daughter to arrive from [facility] to pick her up - pt w/ poor recall of events when reminded.

## 2019-07-28 ENCOUNTER — HOSPITAL ENCOUNTER (EMERGENCY)
Age: 84
Discharge: HOME | End: 2019-07-28
Attending: EMERGENCY MEDICINE
Payer: MEDICARE

## 2019-07-28 ENCOUNTER — APPOINTMENT (OUTPATIENT)
Dept: GENERAL RADIOLOGY | Age: 84
End: 2019-07-28
Attending: PHYSICIAN ASSISTANT
Payer: MEDICARE

## 2019-07-28 ENCOUNTER — HOSPITAL ENCOUNTER (EMERGENCY)
Dept: CT IMAGING | Age: 84
Discharge: HOME | End: 2019-07-28
Attending: PHYSICIAN ASSISTANT
Payer: MEDICARE

## 2019-07-28 VITALS
DIASTOLIC BLOOD PRESSURE: 73 MMHG | WEIGHT: 96 LBS | SYSTOLIC BLOOD PRESSURE: 138 MMHG | OXYGEN SATURATION: 100 % | BODY MASS INDEX: 17.66 KG/M2 | TEMPERATURE: 97.5 F | HEIGHT: 62 IN | HEART RATE: 85 BPM | RESPIRATION RATE: 16 BRPM

## 2019-07-28 DIAGNOSIS — M25.551 PAIN OF RIGHT HIP JOINT: Primary | ICD-10-CM

## 2019-07-28 PROCEDURE — 99285 EMERGENCY DEPT VISIT HI MDM: CPT

## 2019-07-28 PROCEDURE — 73502 X-RAY EXAM HIP UNI 2-3 VIEWS: CPT

## 2019-07-28 PROCEDURE — 73700 CT LOWER EXTREMITY W/O DYE: CPT

## 2019-07-28 NOTE — ED NOTES
Patient comes to ED via EMS with c/o right hip pain. Patient states she fell down approximately 2 stairs somewhere between 2 weeks and 2 months ago. No swelling, bruising, or rotation noted on inspection.

## 2019-07-28 NOTE — ED PROVIDER NOTES
EMERGENCY DEPARTMENT HISTORY AND PHYSICAL EXAM    Date: 7/28/2019  Patient Name: Delfina Reddy    History of Presenting Illness     Chief Complaint   Patient presents with    Hip Pain         History Provided By: Patient    Delfina Reddy is a 80 y.o. female with PMHX of hypertension, acid reflux, celiac disease who presents to the emergency department C/O right hip pain. Patient states that she had a mechanical fall down about 2 stairs 2 weeks ago, has had pain in her right hip ever since. Patient denies head injury or loss of consciousness when this happened, states that she denied that it hurt at the time because she did not want to be seen in the ER. Patient states the pain is been worsening over the past 2 weeks to the point that she is now no longer able to put weight on her hip. Patient denies numbness, tingling. PCP: Micah, MD Oscar        Past History     Past Medical History:  Past Medical History:   Diagnosis Date    Arthritis     Cancer ([facility] [address]     ovarian /bowel cancer    Ill-defined condition     celiac disease    Neurological disorder     ataxia/dizziness       Past Surgical History:  Past Surgical History:   Procedure Laterality Date    ABDOMEN SURGERY PROC UNLISTED      bowel resection    HX GYN      hysterectomy    HX HEENT      tonsillectomy       Family History:  History reviewed. No pertinent family history. Social History:  Social History     Tobacco Use    Smoking status: Never Smoker    Smokeless tobacco: Never Used   Substance Use Topics    Alcohol use: No    Drug use: No       Allergies:  No Known Allergies      Review of Systems   Review of Systems   Constitutional: Negative for chills and fever. Respiratory: Negative for wheezing. Cardiovascular: Negative for chest pain and palpitations. Gastrointestinal: Negative for abdominal pain, nausea and vomiting. Musculoskeletal: Positive for arthralgias.    Neurological: Negative for dizziness, syncope, weakness, light-headedness and headaches. All other systems reviewed and are negative. Physical Exam     Vitals:    07/28/19 1630 07/28/19 1645 07/28/19 1700 07/28/19 1930   BP: 138/65 137/63 129/64 138/73   Pulse:       Resp:       Temp:       SpO2: 98% 98% 98% 100%   Weight:       Height:         Physical Exam   Nursing note and vitals reviewed. CONSTITUTIONAL: Alert, in no apparent distress; well-developed; well-nourished. HEAD:  Normocephalic, atraumatic  EYES: PERRL; EOM's intact. ENTM: Nose: no rhinorrhea; Throat: no erythema or exudate, mucous membranes moist  Neck:  No JVD, supple without lymphadenopathy  RESP: Chest clear, equal breath sounds. CV: S1 and S2 WNL; No murmurs, gallops or rubs. GI: Normal bowel sounds, abdomen soft and non-tender. No masses or organomegaly. : No costo-vertebral angle tenderness. BACK:  Non-tender  UPPER EXT:  Normal inspection  LOWER EXT: No edema, no calf tenderness. Distal pulses intact. Tenderness to palpation of the right hip/right groin, no deformity or internal or external rotation noted on exam, 5/5 strength bilateral lower extremities. NEURO: CN intact, reflexes 2/4 and sym, strength 5/5 and sym, sensation intact. SKIN: normal for age and stage. No ecchymosis or abrasions noted on exam.  PSYCH:  Alert and oriented, normal affect. Diagnostic Study Results     Labs -   No results found for this or any previous visit (from the past 12 hour(s)). Radiologic Studies -wet read right hip, no evidence of fracture.   CT HIP RT WO CONT   Final Result   IMPRESSION:      No acute fractures or malalignment      CPPD      Stercoral colitis      XR HIP RT W OR WO PELV 2-3 VWS    (Results Pending)     CT Results  (Last 48 hours)               07/28/19 1800  CT HIP RT WO CONT Final result    Impression:  IMPRESSION:       No acute fractures or malalignment       CPPD       Stercoral colitis       Narrative:  EXAM: CT of the right hip without contrast INDICATION: Bone pain right hip       COMPARISON: None. TECHNIQUE: Axial CT imaging of the right hip was performed without intravenous   contrast. Multiplanar reformats were generated. One or more dose reduction   techniques were used on this CT: automated exposure control, adjustment of the   mAs and/or kVp according to patient size, and iterative reconstruction   techniques. The specific techniques used on this CT exam have been documented   in the patient's electronic medical record. Digital Imaging and Communications   in Medicine (DICOM) format image data are available to nonaffiliated external   healthcare facilities or entities on a secure, media free, reciprocally   searchable basis with patient authorization for at least a 12-month period after   this study. _______________       FINDINGS:       There is moderate osteopenia. There are no acute fractures or subluxation. There   is chondrocalcinosis the labrum suggesting CPPD. A spurring along the inferior   margin of the femoral head. Visualized both SI joints and pubic symphysis are   intact. Visualized liver, gallbladder, are unremarkable. Moderate calcific   atherosclerotic present. There are no enlarged lymph nodes. Urinary bladder is   unremarkable. Hysterectomy. There is a large amount of stool in the rectum with rectal wall thickening and   minimal stranding suggesting stercoral colitis.       _______________               CXR Results  (Last 48 hours)    None          Medications given in the ED-  Medications - No data to display      Medical Decision Making   I am the first provider for this patient. I reviewed the vital signs, available nursing notes, past medical history, past surgical history, family history and social history. Vital Signs-Reviewed the patient's vital signs. Pulse Oximetry Analysis - 99% on RA     Cardiac Monitor:  Rate: 85 bpm  Rhythm: NSR    Records Reviewed:  Old Medical Records    Procedures:  Procedures    Provider notes/MDM:  DDX: Hip fracture, dislocation, avascular necrosis    ED Course:   3:37 PM  Initial assessment performed. The patients presenting problems have been discussed, and they are in agreement with the care plan formulated and outlined with them. I have encouraged them to ask questions as they arise throughout their visit. 4:19 PM  Reevaluated pt, no change in symptoms. Discussed X-ray wet read, will be overread by the radiologist. Will order CT for further evaluation. 6:13 PM  Reviewed imaging results with patient and daughter, no evidence of fracture seen. Diagnosis and Disposition       Discussion: 80 y.o. female with right hip pain after fall 2 weeks ago, no evidence of fracture on imaging performed in ED. Will order home health care for physical therapy and to help patient manage with activities of daily living. Patient advised to take Motrin or Tylenol as needed for pain, and follow-up with her primary care provider for further evaluation. Patient verbalized understanding and agreed with plan. Return precautions discussed. DISCHARGE NOTE:  Rubin Faulkner's  results have been reviewed with her. She has been counseled regarding her diagnosis, treatment, and plan. She verbally conveys understanding and agreement of the signs, symptoms, diagnosis, treatment and prognosis and additionally agrees to follow up as discussed. She also agrees with the care-plan and conveys that all of her questions have been answered. I have also provided discharge instructions for her that include: educational information regarding their diagnosis and treatment, and list of reasons why they would want to return to the ED prior to their follow-up appointment, should her condition change. She has been provided with education for proper emergency department utilization. CLINICAL IMPRESSION:    1. Pain of right hip joint        PLAN:  1. D/C Home  2. There are no discharge medications for this patient. 3.   Follow-up Information     Follow up With Specialties Details Why Contact Info    Other, MD Oscar    Patient can only remember the practice name and not the physician      Eldon Perez NP Nurse Practitioner Schedule an appointment as soon as possible for a visit in 1 day  [address] [address]      [facility] EMERGENCY DEPT Emergency Medicine Go to  As needed, If symptoms worsen [address]  [phone]          Note completed by Adelia Mayer PA-C        Please note that this dictation was completed with [employer], the computer voice recognition software. Quite often unanticipated grammatical, syntax, homophones, and other interpretive errors are inadvertently transcribed by the computer software. Please disregard these errors. Please excuse any errors that have escaped final proofreading.

## 2019-07-28 NOTE — ED TRIAGE NOTES
Patient is brought to ED via EMS with c/o right hip pain. Patient is a poor historian but states she fell down 2 stairs somewhere between 2 weeks and 2 months ago. Patient states she called her  to c/o right hip pain. No bruising, swelling or rotation noted on inspection. Patient is able to make needs known and speaks in complete sentences.

## 2019-07-28 NOTE — ED NOTES
Patient gave verbal permission to speak to her daughter Philip Corbett on the phone. Gave patient's daughter an update on patient status.

## 2019-07-28 NOTE — ED NOTES
Reviewed discharge instructions with patient. Patient verbalized understanding of instructions. All questions answered    Armband removed and shredded.

## 2019-07-28 NOTE — DISCHARGE INSTRUCTIONS
Continue home medications as needed for pain, a request for the  has been placed to help coordinate further care as far as physical therapy. Follow-up with your primary care provider this week for further evaluation due to ongoing pain.

## 2019-07-30 NOTE — PROGRESS NOTES
7- @ 1045 cm notified by ED  Kay pt was seen in ED and needs home health services along with Pcp, cm attempted to call pt [phone] unavailable cm left voice message to return call. 7-30-19 @ 1300- call received from  KAY informed cm that patients daughter [facility] called [phone] informing her that pt was taken to [facility] via EMS for evaluation and daughter still requesting for home health services to be set up and would like to speak with cm,cm did return call to daughter [facility] after phone conversation with  Kay,cm informed daughter that attempted call was left at patients home and was waiting on return call,after phone conversation with daughter this cm reached out to [facility] ED, verified that pt was in their ED and spoke with patients bedside nurse key Key which she also informed cm she just got off the phone with patients pippa Das,eladia explained that pt was seen at [facility] ED 7-28-19 and discharged with home health services to be ordered, this cm received notification from our  today, at this time cm was informed that pt has not been admitted to [facility] still pending test results, cm asked nurse Reilly Reilly for continuity of care can home health services be ordered by their [address] [place] [address] Rd now that pt is being seen at their facility, Nurse Reilly Reilly informed cm that she will make sure that Ed physician becomes aware and will have home health ordered by ED if discharged, Michelle Perea also made aware that if pt is admitted to [facility], [facility] home health order will be voided pt will require discharge assessment by their case management dept, cm attempted to call patients daughter [facility] [phone] did not answer and voice message was not set up.